# Patient Record
Sex: FEMALE | Race: WHITE
[De-identification: names, ages, dates, MRNs, and addresses within clinical notes are randomized per-mention and may not be internally consistent; named-entity substitution may affect disease eponyms.]

---

## 2017-03-22 ENCOUNTER — PRIOR ORIGINAL RECORDS (OUTPATIENT)
Dept: OTHER | Age: 77
End: 2017-03-22

## 2017-03-22 ENCOUNTER — HOSPITAL (OUTPATIENT)
Dept: OTHER | Age: 77
End: 2017-03-22
Attending: INTERNAL MEDICINE

## 2017-03-22 LAB
ALBUMIN SERPL-MCNC: 4.2 GM/DL (ref 3.6–5.1)
ALBUMIN/GLOB SERPL: 1.3 {RATIO} (ref 1–2.4)
ALP SERPL-CCNC: 140 UNIT/L (ref 45–117)
ALT SERPL-CCNC: 31 UNIT/L
ANALYZER ANC (IANC): NORMAL
ANION GAP SERPL CALC-SCNC: 15 MMOL/L (ref 10–20)
AST SERPL-CCNC: 26 UNIT/L
BILIRUB SERPL-MCNC: 0.5 MG/DL (ref 0.2–1)
BUN SERPL-MCNC: 25 MG/DL (ref 6–20)
BUN/CREAT SERPL: 25 (ref 7–25)
CALCIUM SERPL-MCNC: 9.4 MG/DL (ref 8.4–10.2)
CHLORIDE: 104 MMOL/L (ref 98–107)
CHOLEST SERPL-MCNC: 173 MG/DL
CHOLEST/HDLC SERPL: 2.6 {RATIO}
CO2 SERPL-SCNC: 26 MMOL/L (ref 21–32)
CREAT SERPL-MCNC: 1.02 MG/DL (ref 0.51–0.95)
ERYTHROCYTE [DISTWIDTH] IN BLOOD: 13.6 % (ref 11–15)
GLOBULIN SER-MCNC: 3.3 GM/DL (ref 2–4)
GLUCOSE SERPL-MCNC: 96 MG/DL (ref 65–99)
GLYCOHEMOGLOBIN: 6.4 % (ref 4.5–5.6)
HDLC SERPL-MCNC: 66 MG/DL
HEMATOCRIT: 40 % (ref 36–46.5)
HGB BLD-MCNC: 12.9 GM/DL (ref 12–15.5)
LDLC SERPL CALC-MCNC: 79 MG/DL
LENGTH OF FAST TIME PATIENT: ABNORMAL HR
LENGTH OF FAST TIME PATIENT: NORMAL HR
MCH RBC QN AUTO: 29.4 PG (ref 26–34)
MCHC RBC AUTO-ENTMCNC: 32.3 GM/DL (ref 32–36.5)
MCV RBC AUTO: 91.1 FL (ref 78–100)
NONHDLC SERPL-MCNC: 107 MG/DL
PLATELET # BLD: 312 THOUSAND/MCL (ref 140–450)
POTASSIUM SERPL-SCNC: 3.9 MMOL/L (ref 3.4–5.1)
PROT SERPL-MCNC: 7.5 GM/DL (ref 6.4–8.2)
RBC # BLD: 4.39 MILLION/MCL (ref 4–5.2)
SODIUM SERPL-SCNC: 141 MMOL/L (ref 135–145)
TRIGLYCERIDE (TRIGP): 141 MG/DL
WBC # BLD: 8.3 THOUSAND/MCL (ref 4.2–11)

## 2017-03-23 LAB
ALBUMIN: 4.2 G/DL
ALKALINE PHOSPHATATE(ALK PHOS): 140 IU/L
BILIRUBIN TOTAL: 0.5 MG/DL
BUN: 25 MG/DL
CALCIUM: 9.4 MG/DL
CHLORIDE: 104 MEQ/L
CHOLESTEROL, TOTAL: 173 MG/DL
CREATININE, SERUM: 1.02 MG/DL
GLOBULIN: 3.3 G/DL
GLUCOSE: 96 MG/DL
GLYCOHEMOGLOBIN: 6.4 %
HDL CHOLESTEROL: 66 MG/DL
HEMATOCRIT: 40 %
HEMOGLOBIN: 12.9 G/DL
LDL CHOLESTEROL: 79 MG/DL
NON-HDL CHOLESTEROL: 107 MG/DL
PLATELETS: 312 K/UL
POTASSIUM, SERUM: 3.9 MEQ/L
PROTEIN, TOTAL: 7.5 G/DL
RED BLOOD COUNT: 4.39 X 10-6/U
SGOT (AST): 26 IU/L
SGPT (ALT): 31 IU/L
SODIUM: 141 MEQ/L
TOTAL CHOLESTEROL / HDL RATIO: 2.6 RATIO UN
TRIGLYCERIDES: 141 MG/DL
WHITE BLOOD COUNT: 8.3 X 10-3/U

## 2017-03-30 ENCOUNTER — PRIOR ORIGINAL RECORDS (OUTPATIENT)
Dept: OTHER | Age: 77
End: 2017-03-30

## 2017-09-19 ENCOUNTER — PRIOR ORIGINAL RECORDS (OUTPATIENT)
Dept: OTHER | Age: 77
End: 2017-09-19

## 2017-10-23 ENCOUNTER — PRIOR ORIGINAL RECORDS (OUTPATIENT)
Dept: OTHER | Age: 77
End: 2017-10-23

## 2017-10-25 ENCOUNTER — PRIOR ORIGINAL RECORDS (OUTPATIENT)
Dept: OTHER | Age: 77
End: 2017-10-25

## 2017-11-03 PROBLEM — I25.10 CORONARY ARTERY DISEASE INVOLVING NATIVE CORONARY ARTERY OF NATIVE HEART WITHOUT ANGINA PECTORIS: Status: ACTIVE | Noted: 2017-11-03

## 2017-11-03 PROBLEM — I10 ESSENTIAL HYPERTENSION: Status: ACTIVE | Noted: 2017-11-03

## 2017-11-03 PROBLEM — I83.90 VARICOSE VEIN OF LEG: Status: ACTIVE | Noted: 2017-11-03

## 2017-11-03 PROCEDURE — 82607 VITAMIN B-12: CPT | Performed by: INTERNAL MEDICINE

## 2017-11-06 PROBLEM — R79.89 ELEVATED SERUM CREATININE: Status: ACTIVE | Noted: 2017-11-06

## 2017-11-06 PROBLEM — R73.03 PRE-DIABETES: Status: ACTIVE | Noted: 2017-11-06

## 2018-02-05 PROBLEM — I83.813 VARICOSE VEINS OF BILATERAL LOWER EXTREMITIES WITH PAIN: Status: ACTIVE | Noted: 2017-11-03

## 2018-04-03 ENCOUNTER — PRIOR ORIGINAL RECORDS (OUTPATIENT)
Dept: OTHER | Age: 78
End: 2018-04-03

## 2018-04-05 ENCOUNTER — PRIOR ORIGINAL RECORDS (OUTPATIENT)
Dept: OTHER | Age: 78
End: 2018-04-05

## 2018-04-05 ENCOUNTER — MYAURORA ACCOUNT LINK (OUTPATIENT)
Dept: OTHER | Age: 78
End: 2018-04-05

## 2018-04-05 LAB
ALT (SGPT): 22 U/L
AST (SGOT): 20 U/L
CHOLESTEROL, TOTAL: 171 MG/DL
HDL CHOLESTEROL: 63 MG/DL
LDL CHOLESTEROL: 77 MG/DL
TRIGLYCERIDES: 157 MG/DL

## 2018-11-14 ENCOUNTER — PRIOR ORIGINAL RECORDS (OUTPATIENT)
Dept: OTHER | Age: 78
End: 2018-11-14

## 2019-02-28 VITALS
SYSTOLIC BLOOD PRESSURE: 180 MMHG | HEIGHT: 63 IN | RESPIRATION RATE: 16 BRPM | HEART RATE: 52 BPM | DIASTOLIC BLOOD PRESSURE: 88 MMHG | WEIGHT: 178 LBS | BODY MASS INDEX: 31.54 KG/M2

## 2019-03-01 VITALS
BODY MASS INDEX: 32.6 KG/M2 | DIASTOLIC BLOOD PRESSURE: 90 MMHG | HEIGHT: 63 IN | SYSTOLIC BLOOD PRESSURE: 130 MMHG | HEART RATE: 58 BPM | WEIGHT: 184 LBS

## 2019-03-20 RX ORDER — LOSARTAN POTASSIUM 100 MG/1
TABLET ORAL
COMMUNITY
Start: 2018-06-01

## 2019-03-20 RX ORDER — ASPIRIN 325 MG
TABLET ORAL
COMMUNITY

## 2019-03-20 RX ORDER — CLOPIDOGREL BISULFATE 75 MG/1
TABLET ORAL
COMMUNITY
Start: 2018-06-01

## 2019-03-20 RX ORDER — ROSUVASTATIN CALCIUM 10 MG/1
TABLET, COATED ORAL
COMMUNITY
Start: 2019-01-18

## 2019-03-20 RX ORDER — TRIAMTERENE AND HYDROCHLOROTHIAZIDE 75; 50 MG/1; MG/1
TABLET ORAL
COMMUNITY
Start: 2018-06-01

## 2019-03-21 PROBLEM — N18.30 CKD (CHRONIC KIDNEY DISEASE) STAGE 3, GFR 30-59 ML/MIN (HCC): Status: ACTIVE | Noted: 2019-03-21

## 2019-07-03 ENCOUNTER — TELEPHONE (OUTPATIENT)
Dept: CARDIOLOGY | Age: 79
End: 2019-07-03

## 2021-05-12 PROBLEM — E66.811 OBESITY (BMI 30.0-34.9): Status: ACTIVE | Noted: 2021-05-12

## 2021-05-12 PROBLEM — E66.9 OBESITY (BMI 30.0-34.9): Status: ACTIVE | Noted: 2021-05-12

## 2023-12-15 ENCOUNTER — OFFICE VISIT (OUTPATIENT)
Dept: FAMILY MEDICINE CLINIC | Facility: CLINIC | Age: 83
End: 2023-12-15
Payer: COMMERCIAL

## 2023-12-15 ENCOUNTER — APPOINTMENT (OUTPATIENT)
Dept: URGENT CARE | Age: 83
End: 2023-12-15

## 2023-12-15 VITALS
TEMPERATURE: 98 F | SYSTOLIC BLOOD PRESSURE: 138 MMHG | BODY MASS INDEX: 24.99 KG/M2 | DIASTOLIC BLOOD PRESSURE: 80 MMHG | OXYGEN SATURATION: 98 % | RESPIRATION RATE: 18 BRPM | WEIGHT: 150 LBS | HEIGHT: 65 IN | HEART RATE: 78 BPM

## 2023-12-15 DIAGNOSIS — H00.014 HORDEOLUM EXTERNUM OF LEFT UPPER EYELID: Primary | ICD-10-CM

## 2023-12-15 PROCEDURE — 3008F BODY MASS INDEX DOCD: CPT | Performed by: FAMILY MEDICINE

## 2023-12-15 PROCEDURE — 3079F DIAST BP 80-89 MM HG: CPT | Performed by: FAMILY MEDICINE

## 2023-12-15 PROCEDURE — 1160F RVW MEDS BY RX/DR IN RCRD: CPT | Performed by: FAMILY MEDICINE

## 2023-12-15 PROCEDURE — 99202 OFFICE O/P NEW SF 15 MIN: CPT | Performed by: FAMILY MEDICINE

## 2023-12-15 PROCEDURE — 3075F SYST BP GE 130 - 139MM HG: CPT | Performed by: FAMILY MEDICINE

## 2023-12-15 PROCEDURE — 1159F MED LIST DOCD IN RCRD: CPT | Performed by: FAMILY MEDICINE

## 2023-12-15 RX ORDER — ERYTHROMYCIN 5 MG/G
1 OINTMENT OPHTHALMIC 3 TIMES DAILY
Qty: 3.5 G | Refills: 0 | Status: SHIPPED | OUTPATIENT
Start: 2023-12-15 | End: 2023-12-22

## 2024-03-17 ENCOUNTER — OFFICE VISIT (OUTPATIENT)
Dept: FAMILY MEDICINE CLINIC | Facility: CLINIC | Age: 84
End: 2024-03-17
Payer: COMMERCIAL

## 2024-03-17 VITALS
SYSTOLIC BLOOD PRESSURE: 136 MMHG | WEIGHT: 155 LBS | TEMPERATURE: 99 F | HEIGHT: 65 IN | OXYGEN SATURATION: 98 % | HEART RATE: 52 BPM | RESPIRATION RATE: 16 BRPM | DIASTOLIC BLOOD PRESSURE: 74 MMHG | BODY MASS INDEX: 25.83 KG/M2

## 2024-03-17 DIAGNOSIS — R39.9 UTI SYMPTOMS: Primary | ICD-10-CM

## 2024-03-17 DIAGNOSIS — N76.0 ACUTE VAGINITIS: ICD-10-CM

## 2024-03-17 LAB
APPEARANCE: CLEAR
BILIRUBIN: NEGATIVE
GLUCOSE (URINE DIPSTICK): NEGATIVE MG/DL
KETONES (URINE DIPSTICK): NEGATIVE MG/DL
LEUKOCYTES: NEGATIVE
MULTISTIX LOT#: NORMAL NUMERIC
NITRITE, URINE: NEGATIVE
OCCULT BLOOD: NEGATIVE
PH, URINE: 7 (ref 4.5–8)
PROTEIN (URINE DIPSTICK): NEGATIVE MG/DL
SPECIFIC GRAVITY: 1.01 (ref 1–1.03)
UROBILINOGEN,SEMI-QN: 0.2 MG/DL (ref 0–1.9)

## 2024-03-17 PROCEDURE — 99213 OFFICE O/P EST LOW 20 MIN: CPT | Performed by: PHYSICIAN ASSISTANT

## 2024-03-17 PROCEDURE — 87086 URINE CULTURE/COLONY COUNT: CPT | Performed by: PHYSICIAN ASSISTANT

## 2024-03-17 PROCEDURE — 81003 URINALYSIS AUTO W/O SCOPE: CPT | Performed by: PHYSICIAN ASSISTANT

## 2024-03-17 NOTE — PATIENT INSTRUCTIONS
Preliminary urinalysis is negative, urine culture pending (results in 24-48 hours).   Mycolog cream as prescribed:  apply to external vaginal area twice daily as needed for itching/irritation.   Use spray bottle after urinating to rinse urine off vaginal area, blot dry with toilet paper (avoid rubbing area).   Try to go without underwear at bedtime to improve airflow to area.     Follow up with your primary care provider if your symptoms fail to improve and resolve as anticipated    Go to the Immediate Care or Emergency Department in event of new or worsening symptoms at any time

## 2024-03-17 NOTE — PROGRESS NOTES
CHIEF COMPLAINT:     Chief Complaint   Patient presents with    UTI     Started Wednesday, Positive at home UTI test, burning, frequency, very inflamed and itchy  OTC       HPI:   Carol A Baumgarten is a 83 year old female who presents with symptoms of UTI. Patient reporting symptoms of dysuria x 4 days. (+) vaginal irritation/itching, redness, with urgency/frequency and dysuria. Denies vaginal discharge or bleeding, no hematuria, no flank/abd pain, no fever/chills/sweats.   Prior h/o UTIs.   No recent abx use.     Started using incontinence undergarments in effort to improve air flow and external vaginal irritation.     OTC baking soda, vagisil, vaseline to ext vaginal area without relief.       Current Outpatient Medications   Medication Sig Dispense Refill    nystatin-triamcinolone 100,000-0.1 Units/g-% External Cream Apply to affected external area twice daily as needed for up to 7 days. 30 g 0    TRIAMTERENE-HYDROCHLOROTHIAZIDE 75-50 MG Oral Tab TAKE 1 TABLET EVERY DAY 90 tablet 0    LOSARTAN 100 MG Oral Tab TAKE 1 TABLET EVERY DAY 90 tablet 3    ROSUVASTATIN 10 MG Oral Tab TAKE 1 TABLET EVERY NIGHT 90 tablet 3    METOPROLOL TARTRATE 25 MG Oral Tab TAKE 1 TABLET TWICE DAILY 180 tablet 3    AMLODIPINE 5 MG Oral Tab TAKE 1 TABLET EVERY DAY 90 tablet 0    Ascorbic Acid (VITAMIN C) 1000 MG Oral Tab Take 1,000 mg by mouth daily.      Vitamin D3 1000 units Oral Tab Take 1,000 Units by mouth daily.      Misc Natural Products (OSTEO BI-FLEX ADV JOINT SHIELD) Oral Tab Take 1 tablet by mouth daily.      aspirin 325 MG Oral Tab Take 1 tablet (325 mg total) by mouth daily.  0      Past Medical History:   Diagnosis Date    CAD (coronary artery disease)     MI 2008     Essential hypertension     Hyperlipidemia       Social History:  Social History     Socioeconomic History    Marital status:    Tobacco Use    Smoking status: Never    Smokeless tobacco: Never   Vaping Use    Vaping Use: Never used   Substance and  Sexual Activity    Alcohol use: No    Drug use: No    Sexual activity: Yes     Partners: Male   Other Topics Concern    Exercise Yes    Seat Belt Yes         REVIEW OF SYSTEMS:   GENERAL: See above  GI: See HPI.    : See HPI.      EXAM:   /74   Pulse 52   Temp 98.6 °F (37 °C)   Resp 16   Ht 5' 5\" (1.651 m)   Wt 155 lb (70.3 kg)   LMP  (LMP Unknown)   SpO2 98%   BMI 25.79 kg/m²   GENERAL: well developed, well nourished,in no apparent distress  CARDIO: RRR, no murmurs  LUNGS: clear to ausculation bilaterally, no wheezing or rhonchi  ABD soft, ntnd, no masses, no g/r/r, no organomegaly.   MS  Trace pedal edema kevin, no calf ttp, HANEY, no c/c.       Recent Results (from the past 24 hour(s))   URINALYSIS, AUTO, W/O SCOPE    Collection Time: 03/17/24 10:13 AM   Result Value Ref Range    Glucose Urine Negative Negative mg/dL    Bilirubin Urine Negative Negative    Ketones, UA Negative Negative - Trace mg/dL    Spec Gravity 1.015 1.005 - 1.030    Blood Urine Negative Negative    PH Urine 7.0 5.0 - 8.0    Protein Urine Negative Negative - Trace mg/dL    Urobilinogen Urine 0.2 0.2 - 1.0 mg/dL    Nitrite Urine Negative Negative    Leukocyte Esterase Urine Negative Negative    APPEARANCE clear Clear    Color Urine pale yellow Yellow    Multistix Lot# [id number] Numeric    Multistix Expiration Date 12/31/24 Date         ASSESSMENT AND PLAN:   Carol A Baumgarten is a 83 year old female presents with urinary symptoms.    ASSESSMENT:  Encounter Diagnoses   Name Primary?    UTI symptoms Yes    Acute vaginitis        PLAN:   Urine dip negative, urine cx pending.   Suspect urinary sx secondary to vaginal irritation/contact derm given above tx.  Will try mycolog for sx relief, discussed use of spray bottle after urinating and patting area dry. Pt advised to allow JEANIE at bedtime such as going without undergarments and use of incontinence pad/protective garments sparingly.       Meds & Refills for this Visit:  Requested  Prescriptions     Signed Prescriptions Disp Refills    nystatin-triamcinolone 100,000-0.1 Units/g-% External Cream 30 g 0     Sig: Apply to affected external area twice daily as needed for up to 7 days.       Risk and benefits of medication discussed.   Stressed importance of completing full course of antibiotic unless told otherwise.     The patient indicates understanding of these issues and agrees to the plan.  The patient is asked to see PCP in 3 days if not better. Seek care immediately for new onset of fever, vomiting, worsening symptoms.    Patient Instructions     Preliminary urinalysis is negative, urine culture pending (results in 24-48 hours).   Mycolog cream as prescribed:  apply to external vaginal area twice daily as needed for itching/irritation.   Use spray bottle after urinating to rinse urine off vaginal area, blot dry with toilet paper (avoid rubbing area).   Try to go without underwear at bedtime to improve airflow to area.     Follow up with your primary care provider if your symptoms fail to improve and resolve as anticipated    Go to the Immediate Care or Emergency Department in event of new or worsening symptoms at any time       Lou Bates PA-C

## 2024-12-01 ENCOUNTER — OFFICE VISIT (OUTPATIENT)
Dept: FAMILY MEDICINE CLINIC | Facility: CLINIC | Age: 84
End: 2024-12-01
Payer: COMMERCIAL

## 2024-12-01 VITALS
OXYGEN SATURATION: 100 % | RESPIRATION RATE: 18 BRPM | HEIGHT: 63 IN | DIASTOLIC BLOOD PRESSURE: 78 MMHG | HEART RATE: 78 BPM | WEIGHT: 155 LBS | TEMPERATURE: 97 F | SYSTOLIC BLOOD PRESSURE: 132 MMHG | BODY MASS INDEX: 27.46 KG/M2

## 2024-12-01 DIAGNOSIS — R39.9 UTI SYMPTOMS: Primary | ICD-10-CM

## 2024-12-01 DIAGNOSIS — N89.8 VAGINAL ITCHING: ICD-10-CM

## 2024-12-01 LAB
APPEARANCE: CLEAR
BILIRUBIN: NEGATIVE
GLUCOSE (URINE DIPSTICK): NEGATIVE MG/DL
KETONES (URINE DIPSTICK): NEGATIVE MG/DL
MULTISTIX LOT#: ABNORMAL NUMERIC
NITRITE, URINE: NEGATIVE
PH, URINE: 6 (ref 4.5–8)
PROTEIN (URINE DIPSTICK): NEGATIVE MG/DL
SPECIFIC GRAVITY: 1.01 (ref 1–1.03)
URINE-COLOR: YELLOW
UROBILINOGEN,SEMI-QN: 0.2 MG/DL (ref 0–1.9)

## 2024-12-01 PROCEDURE — 87086 URINE CULTURE/COLONY COUNT: CPT | Performed by: FAMILY MEDICINE

## 2024-12-01 RX ORDER — CEPHALEXIN 500 MG/1
500 CAPSULE ORAL 2 TIMES DAILY
Qty: 14 CAPSULE | Refills: 0 | Status: SHIPPED | OUTPATIENT
Start: 2024-12-01 | End: 2024-12-08

## 2024-12-01 RX ORDER — NYSTATIN AND TRIAMCINOLONE ACETONIDE 100000; 1 [USP'U]/G; MG/G
1 CREAM TOPICAL 2 TIMES DAILY
Qty: 60 G | Refills: 0 | Status: SHIPPED | OUTPATIENT
Start: 2024-12-01 | End: 2024-12-08

## 2024-12-01 NOTE — PROGRESS NOTES
Carol A Baumgarten is a 83 year old female.    S:  Patient presents today with the following concerns:  Chief Complaint   Patient presents with    UTI     Started yesterday urgency      Had diarrhea a couple of days ago.    Denies abdominal or back pain or fevers.  No nausea or vomiting.  No vaginal discharge but has extreme itching in the vaginal area.      Current Outpatient Medications   Medication Sig Dispense Refill    cephALEXin 500 MG Oral Cap Take 1 capsule (500 mg total) by mouth 2 (two) times daily for 7 days. 14 capsule 0    nystatin-triamcinolone 100,000-0.1 Units/g-% External Cream Apply 1 Application topically 2 (two) times daily for 7 days. 60 g 0    nystatin-triamcinolone 100,000-0.1 Units/g-% External Cream Apply to affected external area twice daily as needed for up to 7 days. 30 g 0    TRIAMTERENE-HYDROCHLOROTHIAZIDE 75-50 MG Oral Tab TAKE 1 TABLET EVERY DAY 90 tablet 0    LOSARTAN 100 MG Oral Tab TAKE 1 TABLET EVERY DAY 90 tablet 3    ROSUVASTATIN 10 MG Oral Tab TAKE 1 TABLET EVERY NIGHT 90 tablet 3    METOPROLOL TARTRATE 25 MG Oral Tab TAKE 1 TABLET TWICE DAILY 180 tablet 3    AMLODIPINE 5 MG Oral Tab TAKE 1 TABLET EVERY DAY 90 tablet 0    Ascorbic Acid (VITAMIN C) 1000 MG Oral Tab Take 1 tablet (1,000 mg total) by mouth daily.      Vitamin D3 1000 units Oral Tab Take 1 tablet (1,000 Units total) by mouth daily.      Misc Natural Products (OSTEO BI-FLEX ADV JOINT SHIELD) Oral Tab Take 1 tablet by mouth daily.      aspirin 325 MG Oral Tab Take 1 tablet (325 mg total) by mouth daily.  0     Patient Active Problem List   Diagnosis    Hypercholesterolemia    Essential hypertension    Coronary artery disease involving native coronary artery of native heart without angina pectoris    Varicose veins of bilateral lower extremities with pain    Elevated serum creatinine    Pre-diabetes    CKD (chronic kidney disease) stage 3, GFR 30-59 ml/min ([facility])    Obesity (BMI 30.0-34.9)     No family history on  file.    REVIEW OF SYSTEMS:  GENERAL: feels well otherwise  SKIN: denies any unusual skin lesions  EYES:denies vision change  LUNGS: denies shortness of breath with exertion  CARDIOVASCULAR: denies chest pain on exertion  GI: denies abdominal pain.  No N/V/D/C  : see above  MUSCULOSKELETAL: denies back pain  NEURO: denies headaches    EXAM:  /78   Pulse 78   Temp 97 °F (36.1 °C)   Resp 18   Ht 5' 3\" (1.6 m)   Wt 155 lb (70.3 kg)   LMP  (LMP Unknown)   SpO2 100%   BMI 27.46 kg/m²   Physical Exam  Constitutional:       General: She is not in acute distress.     Appearance: Normal appearance. She is not ill-appearing, toxic-appearing or diaphoretic.   HENT:      Head: Normocephalic and atraumatic.   Eyes:      Extraocular Movements: Extraocular movements intact.      Conjunctiva/sclera: Conjunctivae normal.      Pupils: Pupils are equal, round, and reactive to light.   Cardiovascular:      Rate and Rhythm: Normal rate and regular rhythm.      Heart sounds: Normal heart sounds.   Pulmonary:      Effort: Pulmonary effort is normal.   Abdominal:      General: Bowel sounds are normal. There is no distension.      Palpations: Abdomen is soft. There is no mass.      Tenderness: There is no abdominal tenderness. There is no right CVA tenderness, left CVA tenderness or guarding.   Skin:     General: Skin is warm and dry.   Neurological:      General: No focal deficit present.      Mental Status: She is alert and oriented to person, place, and time.   Psychiatric:         Mood and Affect: Mood normal.         Behavior: Behavior normal.        ASSESSMENT AND PLAN:  Carol A Baumgarten is a 83 year old female.  Encounter Diagnoses   Name Primary?    UTI symptoms Yes    Vaginal itching        No results found.     Orders Placed This Encounter   Procedures    URINALYSIS, AUTO, W/O SCOPE    Urine Culture, Routine     Meds & Refills for this Visit:  Requested Prescriptions     Signed Prescriptions Disp Refills     cephALEXin 500 MG Oral Cap 14 capsule 0     Sig: Take 1 capsule (500 mg total) by mouth 2 (two) times daily for 7 days.    nystatin-triamcinolone 100,000-0.1 Units/g-% External Cream 60 g 0     Sig: Apply 1 Application topically 2 (two) times daily for 7 days.     Imaging & Consults:  None  U/A has minimal findings.  Will start Cephalexin but stop it if urine culture is negative.  Mycolog cream BID to affected genital area. Use up to 7 days.  Gentle skin care.    Go to ED with severe abdominal or back pain or high fevers.    Follow up if symptoms change, worsen, do not improve.    Patient verbalizes understanding of plan.  No follow-ups on file.

## 2024-12-23 ENCOUNTER — OFFICE VISIT (OUTPATIENT)
Dept: FAMILY MEDICINE CLINIC | Facility: CLINIC | Age: 84
End: 2024-12-23
Payer: COMMERCIAL

## 2024-12-23 VITALS
HEART RATE: 68 BPM | DIASTOLIC BLOOD PRESSURE: 59 MMHG | SYSTOLIC BLOOD PRESSURE: 144 MMHG | BODY MASS INDEX: 27.46 KG/M2 | WEIGHT: 155 LBS | HEIGHT: 63 IN | OXYGEN SATURATION: 98 % | TEMPERATURE: 98 F | RESPIRATION RATE: 18 BRPM

## 2024-12-23 DIAGNOSIS — K04.7 DENTAL ABSCESS: Primary | ICD-10-CM

## 2024-12-23 DIAGNOSIS — K08.89 TOOTH PAIN: ICD-10-CM

## 2024-12-23 PROCEDURE — 99213 OFFICE O/P EST LOW 20 MIN: CPT | Performed by: NURSE PRACTITIONER

## 2024-12-23 NOTE — PROGRESS NOTES
CHIEF COMPLAINT:     Chief Complaint   Patient presents with    Dental Problem     Tooth pain   Started yesterday left side             HPI:    Carol A Baumgarten is a 84 year old female who presents for evaluation of a tooth pain on left lower jaw. Pt states it started last night and has been worsening. Pt has taken Tylenol as well as cold compresses to area. Pt called dentist and was informed that her dentist is out of town until Sunday.     Pertinent negatives include no anorexia, congestion, cough, diarrhea, eye pain, facial edema, fatigue, fever, joint pain, rhinorrhea, shortness of breath, sore throat or vomiting.      Current Outpatient Medications   Medication Sig Dispense Refill    amoxicillin clavulanate 875-125 MG Oral Tab Take 1 tablet by mouth 2 (two) times daily for 10 days. 20 tablet 0    nystatin-triamcinolone 100,000-0.1 Units/g-% External Cream Apply to affected external area twice daily as needed for up to 7 days. 30 g 0    TRIAMTERENE-HYDROCHLOROTHIAZIDE 75-50 MG Oral Tab TAKE 1 TABLET EVERY DAY 90 tablet 0    LOSARTAN 100 MG Oral Tab TAKE 1 TABLET EVERY DAY 90 tablet 3    ROSUVASTATIN 10 MG Oral Tab TAKE 1 TABLET EVERY NIGHT 90 tablet 3    METOPROLOL TARTRATE 25 MG Oral Tab TAKE 1 TABLET TWICE DAILY 180 tablet 3    AMLODIPINE 5 MG Oral Tab TAKE 1 TABLET EVERY DAY 90 tablet 0    Ascorbic Acid (VITAMIN C) 1000 MG Oral Tab Take 1 tablet (1,000 mg total) by mouth daily.      Vitamin D3 1000 units Oral Tab Take 1 tablet (1,000 Units total) by mouth daily.      Misc Natural Products (OSTEO BI-FLEX ADV JOINT SHIELD) Oral Tab Take 1 tablet by mouth daily.      aspirin 325 MG Oral Tab Take 1 tablet (325 mg total) by mouth daily.  0      Past Medical History:    CAD (coronary artery disease)    MI 2008     Essential hypertension    Hyperlipidemia      Past Surgical History:   Procedure Laterality Date    Angioplasty (coronary)  2008    stent 2008 MI    Skin surgery  04/24/2019    [facility] SCC Right forehead  medial and lateral Dr. IDA [place]      History reviewed. No pertinent family history.   Social History     Socioeconomic History    Marital status:    Tobacco Use    Smoking status: Never    Smokeless tobacco: Never   Vaping Use    Vaping status: Never Used   Substance and Sexual Activity    Alcohol use: No    Drug use: No    Sexual activity: Yes     Partners: Male   Other Topics Concern    Exercise Yes    Seat Belt Yes         REVIEW OF SYSTEMS:   GENERAL: feels well otherwise, no fever, no chills.  SKIN: Per HPI. No edema. No ulcerations.  HEENT: Denies rhinorrhea, edema of the lips or swelling of throat.  CARDIOVASCULAR: Denies chest pains or palpitations.  LUNGS: Denies shortness of breath with exertion or rest. No cough or wheezing.  LYMPH: Denies enlargement of the lymph nodes.  NEURO: Denies abnormal sensation, tingling of the skin, or numbness.      EXAM:   /59   Pulse 68   Temp 98 °F (36.7 °C)   Resp 18   Ht 5' 3\" (1.6 m)   Wt 155 lb (70.3 kg)   LMP  (LMP Unknown)   SpO2 98%   BMI 27.46 kg/m²   GENERAL: well developed, well nourished,in no apparent distress  SKIN: wnl, no lesions  EYES: PERRLA, EOMI, conjunctiva are clear  HENT: Head atraumatic, normocephalic. TM's WNL bilaterally. Normal external nose. Nasal mucosa pink without edema. No erythema of the throat. Oropharynx moist without lesions. Left lower jaw around left molar + erythema, edema and tenderness.   LUNGS: Clear to auscultation bilaterally.  No wheezing, rhonchi, or rales.  No diminished breath sounds. No increased work of breathing.   CARDIO: RRR without murmur  JOINTS: normal ROM   LYMPH: No  lymphadenopathy.     ASSESSMENT AND PLAN:   Carol A Baumgarten is a 84 year old female who presents for evaluation of a rash. Findings are consistent with:    ASSESSMENT:  Encounter Diagnoses   Name Primary?    Tooth pain     Dental abscess Yes       PLAN: Meds as listed below.  Comfort measures as described in Patient Instructions.       Educated on supportive measures: ibuprofen/tylenol, hydration,  Educated on s/s of worsening sx and when to seek higher level of care  Follow up with pcp if not improving    Meds & Refills for this Visit:  Requested Prescriptions     Signed Prescriptions Disp Refills    amoxicillin clavulanate 875-125 MG Oral Tab 20 tablet 0     Sig: Take 1 tablet by mouth 2 (two) times daily for 10 days.         Risk and benefits of medication discussed.     The patient indicates understanding of these issues and agrees to the plan.  The patient is asked to return in 3 days if sx's persist or worsen.

## 2025-04-23 ENCOUNTER — OFFICE VISIT (OUTPATIENT)
Dept: FAMILY MEDICINE CLINIC | Facility: CLINIC | Age: 85
End: 2025-04-23
Payer: COMMERCIAL

## 2025-04-23 VITALS
WEIGHT: 166 LBS | HEIGHT: 64 IN | TEMPERATURE: 97 F | SYSTOLIC BLOOD PRESSURE: 130 MMHG | DIASTOLIC BLOOD PRESSURE: 76 MMHG | HEART RATE: 70 BPM | BODY MASS INDEX: 28.34 KG/M2 | RESPIRATION RATE: 16 BRPM | OXYGEN SATURATION: 96 %

## 2025-04-23 DIAGNOSIS — K08.89 TOOTH PAIN: Primary | ICD-10-CM

## 2025-04-23 PROCEDURE — 99213 OFFICE O/P EST LOW 20 MIN: CPT | Performed by: NURSE PRACTITIONER

## 2025-04-23 RX ORDER — AMOXICILLIN 875 MG/1
875 TABLET, COATED ORAL 2 TIMES DAILY
Qty: 20 TABLET | Refills: 0 | Status: SHIPPED | OUTPATIENT
Start: 2025-04-23 | End: 2025-05-03

## 2025-04-23 NOTE — PROGRESS NOTES
CHIEF COMPLAINT:     Chief Complaint   Patient presents with    Sinus Problem     4 days, dull ache on L side of face, feels like L gland in swollen, OTC tylenol cold and flu       HPI:   Carol A Baumgarten is a 84 year old female who presents for left cheek pain/jaw pain, tooth pain for   several  days. Patient reports  prior dental infection on left side, pain on left side of cheek and jaw . Symptoms have been worsening since onset.  Treating symptoms with tylenol. Pt is terrified of the dentist and just found a dentist that she trust.      Current Medications[1]   Past Medical History[2]   Past Surgical History[3]      Short Social Hx on File[4]      REVIEW OF SYSTEMS:   GENERAL: normal appetite  SKIN: no rashes or abnormal skin lesions  HEENT: See HPI  LUNGS: See HPI  CARDIOVASCULAR: denies chest pain or palpitations   GI: denies N/V/C or abdominal pain      EXAM:   /76   Pulse 70   Temp 97.3 °F (36.3 °C)   Resp 16   Ht 5' 4\" (1.626 m)   Wt 166 lb (75.3 kg)   LMP  (LMP Unknown)   SpO2 96%   BMI 28.49 kg/m²   GENERAL: well developed, well nourished,in no apparent distress  SKIN: no rashes,no suspicious lesions  HEAD: atraumatic, normocephalic.  pos tenderness on palpation of maxillary sinus on left side and around left upper gum line which is erythematous and tender  EYES: conjunctiva clear, EOM intact  EARS: TM's pearly, no bulging, no retraction,no fluid, bony landmarks visible  NOSE: Nostrils patent, clear nasal discharge, nasal mucosa pink   THROAT: Oral mucosa pink, moist. Posterior pharynx is not erythematous. no exudates. Tonsils 1/4.    NECK: Supple, non-tender  LUNGS: clear to auscultation bilaterally, no wheezes or rhonchi. Breathing is non labored.  CARDIO: RRR without murmur  EXTREMITIES: no cyanosis, clubbing or edema  LYMPH:  no lymphadenopathy.        ASSESSMENT AND PLAN:   Carol A Baumgarten is a 84 year old female who presents with upper respiratory symptoms that are consistent  with    ASSESSMENT:   Encounter Diagnosis   Name Primary?    Tooth pain Yes       PLAN: Meds as below.  Comfort care as described in Patient Instructions  Discussed that patient needs to go to Dentist to have this tooth pulled as recommended from dentist. Pt is very afraid, but I explained the importance of dental infections and treating.  Educated on supportive measures: ibuprofen/tylenol, hydration,  Educated on s/s of worsening sx and when to seek higher level of care  Follow up with pcp if not improving      Meds & Refills for this Visit:  Requested Prescriptions     Signed Prescriptions Disp Refills    amoxicillin 875 MG Oral Tab 20 tablet 0     Sig: Take 1 tablet (875 mg total) by mouth 2 (two) times daily for 10 days.     Risks, benefits, and side effects of medication explained and discussed.    The patient indicates understanding of these issues and agrees to the plan.  The patient is asked to f/u with PCP if sx's persist or worsen.  There are no Patient Instructions on file for this visit.            [1]   Current Outpatient Medications   Medication Sig Dispense Refill    amoxicillin 875 MG Oral Tab Take 1 tablet (875 mg total) by mouth 2 (two) times daily for 10 days. 20 tablet 0    nystatin-triamcinolone 100,000-0.1 Units/g-% External Cream Apply to affected external area twice daily as needed for up to 7 days. 30 g 0    TRIAMTERENE-HYDROCHLOROTHIAZIDE 75-50 MG Oral Tab TAKE 1 TABLET EVERY DAY 90 tablet 0    LOSARTAN 100 MG Oral Tab TAKE 1 TABLET EVERY DAY 90 tablet 3    ROSUVASTATIN 10 MG Oral Tab TAKE 1 TABLET EVERY NIGHT 90 tablet 3    METOPROLOL TARTRATE 25 MG Oral Tab TAKE 1 TABLET TWICE DAILY 180 tablet 3    AMLODIPINE 5 MG Oral Tab TAKE 1 TABLET EVERY DAY 90 tablet 0    Ascorbic Acid (VITAMIN C) 1000 MG Oral Tab Take 1 tablet (1,000 mg total) by mouth daily.      Vitamin D3 1000 units Oral Tab Take 1 tablet (1,000 Units total) by mouth daily.      Misc Natural Products (OSTEO BI-FLEX ADV JOINT  SHIELD) Oral Tab Take 1 tablet by mouth daily.      aspirin 325 MG Oral Tab Take 1 tablet (325 mg total) by mouth daily.  0   [2]   Past Medical History:   CAD (coronary artery disease)    MI 2008     Essential hypertension    Hyperlipidemia   [3]   Past Surgical History:  Procedure Laterality Date    Angioplasty (coronary)  2008    stent 2008 MI    Skin surgery  04/24/2019    [facility] SCC Right forehead medial and lateral Dr. IDA [place]   [4]   Social History  Socioeconomic History    Marital status:    Tobacco Use    Smoking status: Never    Smokeless tobacco: Never   Vaping Use    Vaping status: Never Used   Substance and Sexual Activity    Alcohol use: No    Drug use: No    Sexual activity: Yes     Partners: Male   Other Topics Concern    Exercise Yes    Seat Belt Yes